# Patient Record
(demographics unavailable — no encounter records)

---

## 2017-05-23 NOTE — DIAGNOSTIC IMAGING REPORT
Indication: PAIN



Technique: 3 views right hand



Comparison: none



Findings: No acute fractures. No dislocations. The joint spaces are preserved



Impression: Negative
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
## 2017-07-24 NOTE — DIAGNOSTIC IMAGING REPORT
Indication: PAIN



Technique: 3 views right hand



Comparison: 5/23/2017



Findings: No acute fractures. No dislocations. The joint spaces are preserved.



Impression: Negative



This agrees with the preliminary interpretation provided by the emergency room

physician

## 2018-03-04 NOTE — DIAGNOSTIC IMAGING REPORT
Indication: pain Right hand pain

 

Findings: 3 views of the right hand were obtained. 

 

There is a transverse lucency involving the base of the fifth distal phalange

presumably a fracture. There is some blurring in the area of the lucency suggesting

this is a subacute fracture. Correlate clinically. There is no malalignment.

 

IMPRESSION:

 

Subacute fracture suspected involving the base of the fifth distal phalange

## 2018-03-04 NOTE — DIAGNOSTIC IMAGING REPORT
Indication: Pain

 

Findings: 2  views of the right forearm were obtained. 

 

No acute fractures, malalignment, erosions or periostitis are identified. . Soft

tissues are unremarkable.

 

 

Impression: Negative for acute injury
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
## 2018-04-20 NOTE — EMERGENCY ROOM REPORT
History of Present Illness


General


Chief Complaint:  Pain





Present Illness


HPI


36-year-old female patient presents ER complaining of right rib pain since last 

Friday.


Reports she fell on the ground and hit her right side.


Denies loss of consciousness or dizziness.


Denies headache or head trauma.


Reports pain with inspiration, reports pain is reproducible.


Reports pain worse with coughing.


Reports smoking marijuana.


Reports taking ibuprofen and Vicodin without relief of symptoms.


Denies fever or chest pain.


Allergies:  


Coded Allergies:  


     No Known Allergies (Unverified , 2/18/13)





Patient History


Past Medical History:  see triage record


Reviewed Nursing Documentation:  PMH: Agreed; PSxH: Agreed





Nursing Documentation-PMH


Hx Asthma:  Yes





Review of Systems


All Other Systems:  negative except mentioned in HPI





Physical Exam





Vital Signs








  Date Time  Temp Pulse Resp B/P (MAP) Pulse Ox O2 Delivery O2 Flow Rate FiO2


 


4/20/18 12:38 97.6 82 20 129/94 96 Room Air  





 97.5       








Sp02 EP Interpretation:  reviewed, normal


General Appearance:  well appearing, no apparent distress, alert, GCS 15, non-

toxic


Head:  normocephalic, atraumatic


Eyes:  bilateral eye normal inspection, bilateral eye PERRL


Neck:  full range of motion


Respiratory:  lungs clear, normal breath sounds, no rhonchi, no respiratory 

distress, no accessory muscle use, no wheezing, speaking full sentences


Cardiovascular #1:  regular rate, rhythm, no edema


Musculoskeletal:  back normal, digits/nails normal, gait/station normal, normal 

range of motion, non-tender, other - ribs: no erythema, no ecchymosis, no bony 

deformity, tender - right side lower ribs TTP


Neurologic:  alert, oriented x3, responsive, motor strength/tone normal, 

sensory intact


Psychiatric:  mood/affect normal


Skin:  no rash





Medical Decision Making


PA Attestation


Dr. Cowan  is my supervising Physician whom patient management has been 

discussed with.


Diagnostic Impression:  


 Primary Impression:  


 Contusion of rib on right side


 Additional Impression:  


 Cough


ER Course


Pt. presents to the ED c/o rib pain.





Ddx considered but are not limited to fracture, sprain, strain, contusion, 

pneumothorax.





Vital signs: are WNL, pt. is afebrile





Ordered X-ray and pain medication.





CURES report show no pain medication prescriptions since 2017.





ER COURSE


PE lungs clear to auscultation, no absent breath sounds, no wheezes.


An X-ray of the chest and ribs was ordered, results show no fracture, no 

pneumothorax, per the official reading.





Pain Medication provided in ED.


Patient instructed on rest, ice and heat for pain relief.


Don't smoke.


Patient reports feeling better.


Patient OK for discharge.





DISCHARGE:


-Rx provided for Tylenol for pain symptoms.


-Rx provided for Promethazine with codeine.





At this time pt. is stable for d/c to home. Patient is resting comfortably, in 

no acute distress, nontoxic appearing, talking without difficulty.


Will provide printed patient care instructions, and any necessary prescriptions.


Patient instructed to follow with primary care provider in 3 - 5 days and to 

request further orthopedic follow-up.


Care plan and follow up instructions have been discussed with the patient prior 

to discharge.


Take medications as directed. 


Patient questions asked and answered.


Patient reports understanding and agreement to treatment plan.


ER precautions given, patient instructed to return to ER immediately for any 

new or worsening of symptoms including but not limited to chest pain, SOB, 

intractable vomiting.


Chest X-Ray Diagnostic Results


Chest X-Ray Diagnostic Results :  


   Chest X-Ray Ordered:  Yes


   # of Views/Limited/Complete:  1 View


   Indication:  Chest Pain


   EP Interpretation:  Yes


   PA Xray:  Interpretation reviewed, by supervising MD, and agrees with 

findings.


   Interpretation:  no consolidation, no effusion, no pneumothorax, no acute 

cardiopulmonary disease


   Impression:  No acute disease


   PA ScribDeanna Milner PA-C





Other X-Ray Diagnostic Results


Other X-Ray Diagnostic Results :  


   X-Ray ordered:  right ribs


   # of Views/Limited Vs Complete:  Complete


   Indication:  Pain


   EP Interpretation:  Yes


   PA Xray:  Interpretation reviewed, by supervising MD, and agrees with 

findings.


   Interpretation:  no dislocation, no soft tissue swelling, no fractures


   Impression:  No acute disease


   PA Scribe Anastasia Milner PA-C





Last Vital Signs








  Date Time  Temp Pulse Resp B/P (MAP) Pulse Ox O2 Delivery O2 Flow Rate FiO2


 


4/20/18 12:49 97.5  20 129/94 96 Room Air  





 97.5       


 


4/20/18 12:38  82      








Disposition:  HOME, SELF-CARE


Condition:  Stable


Scripts


Codeine/Promethazine Hcl* (PROMETHAZINE-CODEINE SYRUP*) 118 Ml Syrup


5 ML ORAL Q6H PRN for For Cough for 5 Days, #100 ML 0 Refills


   Prov: Andrei Milner         4/20/18 


Acetaminophen* (TYLENOL EXTRA STRENGTH*) 500 Mg Tablet


500 MG ORAL Q8H PRN for Prn Headache/Temp > 101, #30 TAB 0 Refills


   Prov: Andrei Milner         4/20/18


Patient Instructions:  Cough, Adult, Easy-to-Read, Rib Contusion





Additional Instructions:  


Patient instructed to follow up with primary care provider and discuss further 

referral and treatment as needed.


Patient instructed on RICE method: rest, ice, compression, elevation.


Take medications as directed. 


Patient questions asked and answered.


ER precautions given, patient instructed to return to ER immediately for any 

new or worsening of symptoms.











Andrei Milner Apr 20, 2018 13:00

## 2018-04-20 NOTE — DIAGNOSTIC IMAGING REPORT
Indication: Pain

 

Technique: XRAY Ribs w/PA CXR Uni R

 

Comparison: None

 

Findings: There is no focal airspace consolidation, pleural effusion or pneumothorax.

Heart size and mediastinal contours are within normal limits.

 

Is no definite/displaced rib fracture. Spine and shoulders unremarkable in

appearance.

 

Impression: 

No definite/displaced acute rib fracture.

 

Clear lungs. No evidence of pneumothorax.